# Patient Record
Sex: FEMALE | Race: WHITE | Employment: FULL TIME | URBAN - METROPOLITAN AREA
[De-identification: names, ages, dates, MRNs, and addresses within clinical notes are randomized per-mention and may not be internally consistent; named-entity substitution may affect disease eponyms.]

---

## 2020-06-13 ENCOUNTER — HOSPITAL ENCOUNTER (EMERGENCY)
Age: 40
Discharge: HOME | End: 2020-06-13
Attending: EMERGENCY MEDICINE
Payer: OTHER GOVERNMENT

## 2020-06-13 ENCOUNTER — APPOINTMENT (OUTPATIENT)
Dept: CT IMAGING | Age: 40
End: 2020-06-13
Payer: OTHER GOVERNMENT

## 2020-06-13 VITALS
OXYGEN SATURATION: 97 % | BODY MASS INDEX: 25.4 KG/M2 | WEIGHT: 138 LBS | RESPIRATION RATE: 25 BRPM | HEART RATE: 101 BPM | DIASTOLIC BLOOD PRESSURE: 70 MMHG | SYSTOLIC BLOOD PRESSURE: 115 MMHG | HEIGHT: 62 IN | TEMPERATURE: 98.2 F

## 2020-06-13 LAB
ALBUMIN SERPL-MCNC: 4.6 GM/DL (ref 3.4–5)
ALP BLD-CCNC: 44 IU/L (ref 40–129)
ALT SERPL-CCNC: 15 U/L (ref 10–40)
ANION GAP SERPL CALCULATED.3IONS-SCNC: 10 MMOL/L (ref 4–16)
AST SERPL-CCNC: 14 IU/L (ref 15–37)
BACTERIA: ABNORMAL /HPF
BASOPHILS ABSOLUTE: 0 K/CU MM
BASOPHILS RELATIVE PERCENT: 0.1 % (ref 0–1)
BILIRUB SERPL-MCNC: 0.9 MG/DL (ref 0–1)
BILIRUBIN URINE: NEGATIVE MG/DL
BLOOD, URINE: ABNORMAL
BUN BLDV-MCNC: 11 MG/DL (ref 6–23)
CALCIUM SERPL-MCNC: 8.9 MG/DL (ref 8.3–10.6)
CAST TYPE: ABNORMAL /HPF
CHLORIDE BLD-SCNC: 103 MMOL/L (ref 99–110)
CLARITY: CLEAR
CO2: 25 MMOL/L (ref 21–32)
COLOR: YELLOW
COMMENT UA: NEGATIVE
CREAT SERPL-MCNC: 0.8 MG/DL (ref 0.6–1.1)
CRYSTAL TYPE: ABNORMAL /HPF
DIFFERENTIAL TYPE: ABNORMAL
EOSINOPHILS ABSOLUTE: 0 K/CU MM
EOSINOPHILS RELATIVE PERCENT: 0.2 % (ref 0–3)
EPITHELIAL CELLS, UA: 5 /HPF
GFR AFRICAN AMERICAN: >60 ML/MIN/1.73M2
GFR NON-AFRICAN AMERICAN: >60 ML/MIN/1.73M2
GLUCOSE BLD-MCNC: 97 MG/DL (ref 70–99)
GLUCOSE, URINE: NEGATIVE MG/DL
HCT VFR BLD CALC: 41.3 % (ref 37–47)
HEMOGLOBIN: 14.1 GM/DL (ref 12.5–16)
IMMATURE NEUTROPHIL %: 0.4 % (ref 0–0.43)
INTERPRETATION: NORMAL
KETONES, URINE: NEGATIVE MG/DL
LEUKOCYTE ESTERASE, URINE: NEGATIVE
LYMPHOCYTES ABSOLUTE: 0.3 K/CU MM
LYMPHOCYTES RELATIVE PERCENT: 1.9 % (ref 24–44)
MCH RBC QN AUTO: 31 PG (ref 27–31)
MCHC RBC AUTO-ENTMCNC: 34.1 % (ref 32–36)
MCV RBC AUTO: 90.8 FL (ref 78–100)
MONOCYTES ABSOLUTE: 0.4 K/CU MM
MONOCYTES RELATIVE PERCENT: 2.4 % (ref 0–4)
NITRITE URINE, QUANTITATIVE: NEGATIVE
PDW BLD-RTO: 13 % (ref 11.7–14.9)
PH, URINE: 5 (ref 5–8)
PLATELET # BLD: 174 K/CU MM (ref 140–440)
PMV BLD AUTO: 9.2 FL (ref 7.5–11.1)
POTASSIUM SERPL-SCNC: 3.4 MMOL/L (ref 3.5–5.1)
PREGNANCY, URINE: NEGATIVE
PROTEIN UA: NEGATIVE MG/DL
RBC # BLD: 4.55 M/CU MM (ref 4.2–5.4)
RBC URINE: 8 /HPF (ref 0–6)
SEGMENTED NEUTROPHILS ABSOLUTE COUNT: 17.3 K/CU MM
SEGMENTED NEUTROPHILS RELATIVE PERCENT: 95 % (ref 36–66)
SODIUM BLD-SCNC: 138 MMOL/L (ref 135–145)
SPECIFIC GRAVITY UA: 1.01 (ref 1–1.03)
SPECIFIC GRAVITY, URINE: 1.01 (ref 1–1.03)
TOTAL IMMATURE NEUTOROPHIL: 0.07 K/CU MM
TOTAL PROTEIN: 7.8 GM/DL (ref 6.4–8.2)
UROBILINOGEN, URINE: 0.2 MG/DL (ref 0.2–1)
WBC # BLD: 18.1 K/CU MM (ref 4–10.5)
WBC UA: 2 /HPF (ref 0–5)

## 2020-06-13 PROCEDURE — 99284 EMERGENCY DEPT VISIT MOD MDM: CPT

## 2020-06-13 PROCEDURE — 6360000002 HC RX W HCPCS: Performed by: EMERGENCY MEDICINE

## 2020-06-13 PROCEDURE — 96375 TX/PRO/DX INJ NEW DRUG ADDON: CPT

## 2020-06-13 PROCEDURE — 96365 THER/PROPH/DIAG IV INF INIT: CPT

## 2020-06-13 PROCEDURE — 2580000003 HC RX 258: Performed by: EMERGENCY MEDICINE

## 2020-06-13 PROCEDURE — 87086 URINE CULTURE/COLONY COUNT: CPT

## 2020-06-13 PROCEDURE — 96361 HYDRATE IV INFUSION ADD-ON: CPT

## 2020-06-13 PROCEDURE — 81001 URINALYSIS AUTO W/SCOPE: CPT

## 2020-06-13 PROCEDURE — 93005 ELECTROCARDIOGRAM TRACING: CPT | Performed by: EMERGENCY MEDICINE

## 2020-06-13 PROCEDURE — 81025 URINE PREGNANCY TEST: CPT

## 2020-06-13 PROCEDURE — 80053 COMPREHEN METABOLIC PANEL: CPT

## 2020-06-13 PROCEDURE — 74176 CT ABD & PELVIS W/O CONTRAST: CPT

## 2020-06-13 PROCEDURE — 85025 COMPLETE CBC W/AUTO DIFF WBC: CPT

## 2020-06-13 RX ORDER — NITROFURANTOIN 25; 75 MG/1; MG/1
100 CAPSULE ORAL 2 TIMES DAILY
COMMUNITY
End: 2020-06-13

## 2020-06-13 RX ORDER — 0.9 % SODIUM CHLORIDE 0.9 %
1000 INTRAVENOUS SOLUTION INTRAVENOUS ONCE
Status: COMPLETED | OUTPATIENT
Start: 2020-06-13 | End: 2020-06-13

## 2020-06-13 RX ORDER — ONDANSETRON 2 MG/ML
4 INJECTION INTRAMUSCULAR; INTRAVENOUS ONCE
Status: COMPLETED | OUTPATIENT
Start: 2020-06-13 | End: 2020-06-13

## 2020-06-13 RX ORDER — IBUPROFEN 800 MG/1
800 TABLET ORAL EVERY 8 HOURS PRN
Qty: 24 TABLET | Refills: 0 | Status: SHIPPED | OUTPATIENT
Start: 2020-06-13

## 2020-06-13 RX ORDER — KETOROLAC TROMETHAMINE 30 MG/ML
30 INJECTION, SOLUTION INTRAMUSCULAR; INTRAVENOUS ONCE
Status: COMPLETED | OUTPATIENT
Start: 2020-06-13 | End: 2020-06-13

## 2020-06-13 RX ORDER — SULFAMETHOXAZOLE AND TRIMETHOPRIM 800; 160 MG/1; MG/1
1 TABLET ORAL 2 TIMES DAILY
Qty: 14 TABLET | Refills: 0 | Status: SHIPPED | OUTPATIENT
Start: 2020-06-13 | End: 2020-06-20

## 2020-06-13 RX ORDER — ONDANSETRON 4 MG/1
4 TABLET, ORALLY DISINTEGRATING ORAL EVERY 8 HOURS PRN
Qty: 15 TABLET | Refills: 0 | Status: SHIPPED | OUTPATIENT
Start: 2020-06-13

## 2020-06-13 RX ADMIN — KETOROLAC TROMETHAMINE 30 MG: 30 INJECTION, SOLUTION INTRAMUSCULAR; INTRAVENOUS at 13:15

## 2020-06-13 RX ADMIN — ONDANSETRON 4 MG: 2 INJECTION INTRAMUSCULAR; INTRAVENOUS at 13:15

## 2020-06-13 RX ADMIN — CEFTRIAXONE SODIUM 1 G: 1 INJECTION, POWDER, FOR SOLUTION INTRAMUSCULAR; INTRAVENOUS at 14:19

## 2020-06-13 RX ADMIN — SODIUM CHLORIDE 1000 ML: 9 INJECTION, SOLUTION INTRAVENOUS at 13:15

## 2020-06-13 ASSESSMENT — PAIN DESCRIPTION - DESCRIPTORS
DESCRIPTORS: ACHING
DESCRIPTORS: ACHING

## 2020-06-13 ASSESSMENT — PAIN DESCRIPTION - FREQUENCY: FREQUENCY: CONTINUOUS

## 2020-06-13 ASSESSMENT — PAIN SCALES - GENERAL
PAINLEVEL_OUTOF10: 8
PAINLEVEL_OUTOF10: 8
PAINLEVEL_OUTOF10: 2

## 2020-06-13 ASSESSMENT — PAIN DESCRIPTION - LOCATION: LOCATION: BACK

## 2020-06-13 ASSESSMENT — PAIN DESCRIPTION - PAIN TYPE: TYPE: ACUTE PAIN

## 2020-06-13 NOTE — ED PROVIDER NOTES
was discharged home in stable condition. Clinical Impression:  1. Acute cystitis with hematuria    2. Leukocytosis, unspecified type    3. Non-intractable vomiting with nausea, unspecified vomiting type    4. Tachycardia        Disposition referral (if applicable):  Daniela Jang, [facility] [address]  [phone]    Schedule an appointment as soon as possible for a visit       [facility]  [address]  [phone]  Go to   If symptoms worsen      Disposition medications (if applicable):  New Prescriptions    IBUPROFEN (ADVIL;MOTRIN) 800 MG TABLET    Take 1 tablet by mouth every 8 hours as needed for Pain    ONDANSETRON (ZOFRAN ODT) 4 MG DISINTEGRATING TABLET    Take 1 tablet by mouth every 8 hours as needed for Nausea    SULFAMETHOXAZOLE-TRIMETHOPRIM (BACTRIM DS) 800-160 MG PER TABLET    Take 1 tablet by mouth 2 times daily for 7 days         Comment: Please note this report has been produced using speech recognition software and may contain errors related to that system including errors in grammar, punctuation, and spelling, as well as words and phrases that may be inappropriate. If there are any questions or concerns please feel free to contact the dictating provider for clarification.         Tej Sánchez MD  06/13/20 0272

## 2020-06-14 PROCEDURE — 93010 ELECTROCARDIOGRAM REPORT: CPT | Performed by: INTERNAL MEDICINE

## 2020-06-15 LAB
CULTURE: NORMAL
Lab: NORMAL
SPECIMEN: NORMAL

## 2020-06-17 LAB
EKG ATRIAL RATE: 123 BPM
EKG DIAGNOSIS: NORMAL
EKG P-R INTERVAL: 120 MS
EKG Q-T INTERVAL: 386 MS
EKG QRS DURATION: 154 MS
EKG QTC CALCULATION (BAZETT): 552 MS
EKG R AXIS: -48 DEGREES
EKG T AXIS: 92 DEGREES
EKG VENTRICULAR RATE: 123 BPM